# Patient Record
Sex: MALE | Race: WHITE | NOT HISPANIC OR LATINO | URBAN - METROPOLITAN AREA
[De-identification: names, ages, dates, MRNs, and addresses within clinical notes are randomized per-mention and may not be internally consistent; named-entity substitution may affect disease eponyms.]

---

## 2023-09-12 ENCOUNTER — OFFICE VISIT (OUTPATIENT)
Dept: NEUROLOGY | Facility: CLINIC | Age: 28
End: 2023-09-12
Payer: COMMERCIAL

## 2023-09-12 ENCOUNTER — LAB VISIT (OUTPATIENT)
Dept: LAB | Facility: HOSPITAL | Age: 28
End: 2023-09-12
Attending: PSYCHIATRY & NEUROLOGY
Payer: COMMERCIAL

## 2023-09-12 VITALS — WEIGHT: 192.44 LBS | HEART RATE: 65 BPM | DIASTOLIC BLOOD PRESSURE: 85 MMHG | SYSTOLIC BLOOD PRESSURE: 126 MMHG

## 2023-09-12 DIAGNOSIS — G43.711 CHRONIC MIGRAINE WITHOUT AURA, WITH INTRACTABLE MIGRAINE, SO STATED, WITH STATUS MIGRAINOSUS: ICD-10-CM

## 2023-09-12 DIAGNOSIS — G43.711 CHRONIC MIGRAINE WITHOUT AURA, WITH INTRACTABLE MIGRAINE, SO STATED, WITH STATUS MIGRAINOSUS: Primary | ICD-10-CM

## 2023-09-12 LAB — VIT B12 SERPL-MCNC: 641 PG/ML (ref 210–950)

## 2023-09-12 PROCEDURE — 99999 PR PBB SHADOW E&M-NEW PATIENT-LVL III: CPT | Mod: PBBFAC,,, | Performed by: PSYCHIATRY & NEUROLOGY

## 2023-09-12 PROCEDURE — 1159F MED LIST DOCD IN RCRD: CPT | Mod: CPTII,S$GLB,, | Performed by: PSYCHIATRY & NEUROLOGY

## 2023-09-12 PROCEDURE — 3079F PR MOST RECENT DIASTOLIC BLOOD PRESSURE 80-89 MM HG: ICD-10-PCS | Mod: CPTII,S$GLB,, | Performed by: PSYCHIATRY & NEUROLOGY

## 2023-09-12 PROCEDURE — 84252 ASSAY OF VITAMIN B-2: CPT | Performed by: PSYCHIATRY & NEUROLOGY

## 2023-09-12 PROCEDURE — 82607 VITAMIN B-12: CPT | Performed by: PSYCHIATRY & NEUROLOGY

## 2023-09-12 PROCEDURE — 99205 OFFICE O/P NEW HI 60 MIN: CPT | Mod: S$GLB,,, | Performed by: PSYCHIATRY & NEUROLOGY

## 2023-09-12 PROCEDURE — 36415 COLL VENOUS BLD VENIPUNCTURE: CPT | Performed by: PSYCHIATRY & NEUROLOGY

## 2023-09-12 PROCEDURE — 1159F PR MEDICATION LIST DOCUMENTED IN MEDICAL RECORD: ICD-10-PCS | Mod: CPTII,S$GLB,, | Performed by: PSYCHIATRY & NEUROLOGY

## 2023-09-12 PROCEDURE — 99205 PR OFFICE/OUTPT VISIT, NEW, LEVL V, 60-74 MIN: ICD-10-PCS | Mod: S$GLB,,, | Performed by: PSYCHIATRY & NEUROLOGY

## 2023-09-12 PROCEDURE — 3074F PR MOST RECENT SYSTOLIC BLOOD PRESSURE < 130 MM HG: ICD-10-PCS | Mod: CPTII,S$GLB,, | Performed by: PSYCHIATRY & NEUROLOGY

## 2023-09-12 PROCEDURE — 84425 ASSAY OF VITAMIN B-1: CPT | Performed by: PSYCHIATRY & NEUROLOGY

## 2023-09-12 PROCEDURE — 3074F SYST BP LT 130 MM HG: CPT | Mod: CPTII,S$GLB,, | Performed by: PSYCHIATRY & NEUROLOGY

## 2023-09-12 PROCEDURE — 86364 TISS TRNSGLTMNASE EA IG CLAS: CPT | Performed by: PSYCHIATRY & NEUROLOGY

## 2023-09-12 PROCEDURE — 84207 ASSAY OF VITAMIN B-6: CPT | Performed by: PSYCHIATRY & NEUROLOGY

## 2023-09-12 PROCEDURE — 99999 PR PBB SHADOW E&M-NEW PATIENT-LVL III: ICD-10-PCS | Mod: PBBFAC,,, | Performed by: PSYCHIATRY & NEUROLOGY

## 2023-09-12 PROCEDURE — 3079F DIAST BP 80-89 MM HG: CPT | Mod: CPTII,S$GLB,, | Performed by: PSYCHIATRY & NEUROLOGY

## 2023-09-12 RX ORDER — RIMEGEPANT SULFATE 75 MG/75MG
75 TABLET, ORALLY DISINTEGRATING ORAL ONCE AS NEEDED
COMMUNITY
End: 2023-09-12

## 2023-09-12 RX ORDER — ONDANSETRON 4 MG/1
8 TABLET, FILM COATED ORAL EVERY 4 HOURS PRN
COMMUNITY
End: 2023-09-13 | Stop reason: SDUPTHER

## 2023-09-12 RX ORDER — TRIPROLIDINE/PSEUDOEPHEDRINE 2.5MG-60MG
TABLET ORAL EVERY 6 HOURS PRN
COMMUNITY

## 2023-09-12 RX ORDER — RIMEGEPANT SULFATE 75 MG/75MG
75 TABLET, ORALLY DISINTEGRATING ORAL ONCE AS NEEDED
Qty: 12 TABLET | Refills: 12 | Status: SHIPPED | OUTPATIENT
Start: 2023-09-12 | End: 2023-09-13 | Stop reason: SDUPTHER

## 2023-09-12 RX ORDER — SUMATRIPTAN 5 MG/1
5 SPRAY NASAL ONCE
COMMUNITY
End: 2023-11-30 | Stop reason: SDUPTHER

## 2023-09-12 RX ORDER — ONDANSETRON 8 MG/1
8 TABLET, ORALLY DISINTEGRATING ORAL EVERY 12 HOURS PRN
Qty: 20 TABLET | Refills: 6 | Status: SHIPPED | OUTPATIENT
Start: 2023-09-12 | End: 2023-12-12 | Stop reason: SDUPTHER

## 2023-09-12 RX ORDER — ZONISAMIDE 100 MG/1
300 CAPSULE ORAL DAILY
Qty: 90 CAPSULE | Refills: 11 | Status: SHIPPED | OUTPATIENT
Start: 2023-09-12 | End: 2023-09-13 | Stop reason: SDUPTHER

## 2023-09-12 NOTE — PROGRESS NOTES
Subjective     Patient ID: Shahbaz Holbrook is a 28 y.o. male.    Chief Complaint: Headache, Migraine, and Consult  Self referral   HPI  PhD student     Headache history:   Age of onset -  childhood   Location - behind eyes, L or R   Nature of pain -  throbbing   Prodrome - no   Aura -  no   Duration of headache - > 4 hrs   Time to peak intensity - hrs   Pain scale - range of intensity -  10/10  Frequency -  5/days of week   Status Migrainosus history - yes, 4 days   Time of day of most headaches- anytime     Associated symptoms with the headache:   Meningeal symptoms - photophobia, phonophobia, exercise intolerance +   Nausea/vomitting +   Nasal drainage   Visual blurriness   Pallor/flushing  Dizziness +   Vertigo  Confusion  Impaired concentration +   Pain worsened with physical activity +  Neck pain +     Cluster headache symptoms: none   Symptoms of increased intracranial pressure: none     Headache Triggers: unknown     Other comorbid conditions:  Anxiety - no   Motion sickness symptom - no     Treatment history:  Resolution of headache with sleep - yes   Meds tried:  Relpax, maxalt, nasal imitrex, Imitrex - not  help   Zofran - helps   Ubrelvy - not help   Nurtec - helps   Preventive options: AIMOVIG 140 mg x 1 yr - elevated BP, Topamax, Elavil 10 mg, Nurtec, Emgality     Headache risk factors:   H/o TBI  - no   H/o Meningitis  - no   F/h Aneurysms  - no    Headache burden: missed several days of school       Review of Systems   Constitutional: Negative.    HENT: Negative.     Eyes: Negative.    Respiratory: Negative.     Cardiovascular: Negative.    Gastrointestinal: Negative.    Endocrine: Negative.    Genitourinary: Negative.    Musculoskeletal: Negative.    Integumentary:  Negative.   Allergic/Immunologic: Negative.    Neurological: Negative.    Hematological: Negative.    Psychiatric/Behavioral: Negative.          Objective     Physical Exam  Constitutional:       Appearance: Normal appearance.   HENT:       Head: Normocephalic and atraumatic.      Right Ear: External ear normal.      Left Ear: External ear normal.      Nose: Nose normal.      Mouth/Throat:      Mouth: Mucous membranes are moist.   Eyes:      Extraocular Movements: Extraocular movements intact.      Conjunctiva/sclera: Conjunctivae normal.      Pupils: Pupils are equal, round, and reactive to light.   Cardiovascular:      Rate and Rhythm: Normal rate.      Pulses: Normal pulses.   Pulmonary:      Effort: Pulmonary effort is normal.   Musculoskeletal:         General: Normal range of motion.      Cervical back: Normal range of motion.   Skin:     General: Skin is warm.   Neurological:      General: No focal deficit present.      Mental Status: He is alert and oriented to person, place, and time.      Sensory: No sensory deficit.      Motor: No weakness.      Coordination: Coordination normal.      Gait: Gait normal.   Psychiatric:         Mood and Affect: Mood normal.         Behavior: Behavior normal.         Thought Content: Thought content normal.         Judgment: Judgment normal.     Headache Exam:  Optic disc is flat OU   Temples appear symmetric  No V1,V2,V3 distribution allodynia/hyperalgesia alphonse   No facial swelling  No gross TMJ abnormalities   No ptosis   No conj injection   No meningismus   No neck stiffness  No C spine tenderness  No Cervical facet tenderness on palpation alphonse   No tender points over trapezium   No supraorbital nerve exit point tenderness  No occipital nerve exit point tenderness  No auriculotemporal nerve tenderness      Assessment and Plan     1. Chronic migraine without aura, with intractable migraine, so stated, with status migrainosus    S/p LASIK        Start Zonisamide 100 mg daily -> 300 mg. Discussed side effects   Start VYEPTI 300 mg. Discussed side effects   PRN - zofran, nurtec  Given long standing history of headaches with no change in character and no associated neurological symptoms, no reportable neurological  symptoms in between episodes, and normal neuro exam today there is no indication for an MRI.   Will check vitamin B1, B2, B6, B12, gluten enteropathy testing.      No follow-ups on file.

## 2023-09-13 ENCOUNTER — TELEPHONE (OUTPATIENT)
Dept: NEUROLOGY | Facility: CLINIC | Age: 28
End: 2023-09-13
Payer: COMMERCIAL

## 2023-09-13 ENCOUNTER — PATIENT MESSAGE (OUTPATIENT)
Dept: NEUROLOGY | Facility: CLINIC | Age: 28
End: 2023-09-13
Payer: COMMERCIAL

## 2023-09-13 DIAGNOSIS — G43.711 CHRONIC MIGRAINE WITHOUT AURA, WITH INTRACTABLE MIGRAINE, SO STATED, WITH STATUS MIGRAINOSUS: ICD-10-CM

## 2023-09-13 RX ORDER — SODIUM CHLORIDE 9 MG/ML
INJECTION, SOLUTION INTRAVENOUS ONCE AS NEEDED
Status: CANCELLED | OUTPATIENT
Start: 2023-09-13

## 2023-09-13 RX ORDER — SODIUM CHLORIDE 0.9 % (FLUSH) 0.9 %
10 SYRINGE (ML) INJECTION
Status: CANCELLED | OUTPATIENT
Start: 2023-09-13

## 2023-09-13 RX ORDER — DIPHENHYDRAMINE HYDROCHLORIDE 50 MG/ML
25 INJECTION INTRAMUSCULAR; INTRAVENOUS ONCE AS NEEDED
Status: CANCELLED | OUTPATIENT
Start: 2023-09-13

## 2023-09-13 RX ORDER — EPINEPHRINE 0.3 MG/.3ML
0.3 INJECTION SUBCUTANEOUS ONCE AS NEEDED
Status: CANCELLED
Start: 2023-09-13

## 2023-09-13 RX ORDER — ACETAMINOPHEN 500 MG
1000 TABLET ORAL ONCE AS NEEDED
Status: CANCELLED | OUTPATIENT
Start: 2023-09-13

## 2023-09-13 RX ORDER — METHYLPREDNISOLONE SOD SUCC 125 MG
125 VIAL (EA) INJECTION ONCE AS NEEDED
Status: CANCELLED | OUTPATIENT
Start: 2023-09-13

## 2023-09-13 RX ORDER — ONDANSETRON 2 MG/ML
8 INJECTION INTRAMUSCULAR; INTRAVENOUS ONCE AS NEEDED
Status: CANCELLED | OUTPATIENT
Start: 2023-09-13

## 2023-09-13 NOTE — TELEPHONE ENCOUNTER
----- Message from Natalia Arnold sent at 9/13/2023  1:26 PM CDT -----  Regarding: RE: Chart Notes Or prior Authorization  Contact: Nikita [phone]  This is not a PA for me to complete. [facility] pharmacy is requesting chart notes so that they can complete the PA.   ----- Message -----  From: Lizz Gonsales MA  Sent: 9/13/2023  10:28 AM CDT  To: Natalia Arnold  Subject: FW: Chart Notes Or prior Authorization             ----- Message -----  From: Zenia Sampson  Sent: 9/13/2023   9:26 AM CDT  To: [facility]to Stallworth Staff  Subject: Chart Notes Or prior Authorization               Nikita with [facility] is calling in ref to pt's medication rimegepant (NURTEC) 75 mg odt. He says chart notes are needed for prior authorization. Best Call Back Number: Nikita [phone] fax [phone]

## 2023-09-13 NOTE — TELEPHONE ENCOUNTER
----- Message from Basim Dangelo MD sent at 9/12/2023  3:46 PM CDT -----  Regarding: VYEPTI  I would like to start VYEPTI 300 mg IV Q 90 days for chronic migraine

## 2023-09-13 NOTE — TELEPHONE ENCOUNTER
Vyepti 300mg q3 months therapy plan placed per 's order. OHIP notified. Pending authorization from insurance. Once approved OHIP will contact patient for scheduling.

## 2023-09-15 LAB
GLIADIN PEPTIDE IGA SER-ACNC: 2 U/ML
GLIADIN PEPTIDE IGG SER-ACNC: <0.6 U/ML
IGA SERPL-MCNC: 139 MG/DL (ref 70–400)
TTG IGA SER-ACNC: <0.2 U/ML
TTG IGG SER-ACNC: <0.6 U/ML

## 2023-09-16 LAB — VIT B2 SERPL-MCNC: 9 MCG/L (ref 1–19)

## 2023-09-18 LAB
PYRIDOXAL SERPL-MCNC: 62 UG/L (ref 5–50)
VIT B1 BLD-MCNC: 58 UG/L (ref 38–122)

## 2023-09-18 RX ORDER — ONDANSETRON 4 MG/1
8 TABLET, FILM COATED ORAL EVERY 4 HOURS PRN
Qty: 40 TABLET | Refills: 12 | Status: SHIPPED | OUTPATIENT
Start: 2023-09-18

## 2023-09-18 RX ORDER — RIMEGEPANT SULFATE 75 MG/75MG
75 TABLET, ORALLY DISINTEGRATING ORAL ONCE AS NEEDED
Qty: 12 TABLET | Refills: 11 | Status: SHIPPED | OUTPATIENT
Start: 2023-09-18 | End: 2023-09-27 | Stop reason: SDUPTHER

## 2023-09-18 RX ORDER — ZONISAMIDE 100 MG/1
300 CAPSULE ORAL DAILY
Qty: 90 CAPSULE | Refills: 11 | Status: SHIPPED | OUTPATIENT
Start: 2023-09-18 | End: 2023-12-12 | Stop reason: SDUPTHER

## 2023-09-19 ENCOUNTER — TELEPHONE (OUTPATIENT)
Dept: NEUROLOGY | Facility: CLINIC | Age: 28
End: 2023-09-19
Payer: COMMERCIAL

## 2023-09-19 NOTE — TELEPHONE ENCOUNTER
----- Message from Lizz Gonsales MA sent at 9/19/2023 10:16 AM CDT -----  Regarding: FW: Prior Auth Status  Contact: [phone]    ----- Message -----  From: Corin Metcalf  Sent: 9/19/2023  10:04 AM CDT  To: L[facility]to Stallworth Staff  Subject: Prior Auth Status                                CONSULT/ADVISORY    Name of Caller: [facility]    Contact Preference: [phone]    Nature of Call: Calling to check the status of a prior auth for rx zavegepant 10 mg/actuation [place]

## 2023-09-25 ENCOUNTER — TELEPHONE (OUTPATIENT)
Dept: NEUROLOGY | Facility: CLINIC | Age: 28
End: 2023-09-25
Payer: COMMERCIAL

## 2023-09-25 NOTE — TELEPHONE ENCOUNTER
Notified by Emily with OHIP that the pt's insurance had denied the vyepti infusions:     Shahbaz Holbrook has been Denied:     Authorization DENIED - VYEPTI 300mg every 3 months   Insurance company: [facility] [employer]   Phone: [phone]   Denial provided via: Phone   Spoke with: Susan   Drug codes DENIED:    Denial Reason:  not medically necessary   Ref#:  [id number]   Can appeal, but no P2P info provided               Asked Emily if she can obtain official insurance denial letter and send to me so I can begin appeal. Awaiting response.

## 2023-09-26 ENCOUNTER — TELEPHONE (OUTPATIENT)
Dept: NEUROLOGY | Facility: CLINIC | Age: 28
End: 2023-09-26
Payer: COMMERCIAL

## 2023-09-26 NOTE — TELEPHONE ENCOUNTER
----- Message from Lizz Gonsales MA sent at 9/26/2023 10:02 AM CDT -----  Regarding: FW: RX injection / appt  Contact: PT @ [phone]    ----- Message -----  From: Manoj Martinez  Sent: 9/26/2023   8:36 AM CDT  To: [facility]to Stallworth Staff  Subject: RX injection / appt                              Pt is calling asking to speak with someone in the office to schedule an injection appt to receive rx: Vyepti (Eptinezumab-jjmr). Pt states that Dr. Dangelo advised him that someone would call him within a week from his last appt to schedule the injection; pt states that he has not heard from anyone. Please call to advise. Thanks.

## 2023-09-26 NOTE — TELEPHONE ENCOUNTER
----- Message from Lizz Gonsales MA sent at 9/26/2023 10:02 AM CDT -----  Regarding: FW: RX approval update  Contact: PT @ [phone]    ----- Message -----  From: Manoj Martinez  Sent: 9/26/2023   8:32 AM CDT  To: Loreto Stallworth Staff  Subject: RX approval update                               Pt is calling asking to speak with someone in the office to discuss rx: rimegepant (NURTEC) 75 mg odt. Pt states that he spoke to the specialty pharmacy and they have advised him that they have sent over paper work for the office to fill out; this was on 9/12/2023 and still have not received documents back. Pt is asking for a return call back for an update. Thanks.      [facility] - [place], [place] - [address] [facility]   [address] [facility] [place]   [address]  [facility] [address]  Phone: [phone] Fax: [phone]    Pt states that he spoke to [facility] and they sent the info to Neena. Neena states that they are the ones waiting on the paper work from the office.      [facility] (New Address) - [place], [place] - [address] [place] [address] AT Previously: [address], [address]  [address]  [facility] [address]  Phone: [phone] Fax: [phone]

## 2023-09-27 DIAGNOSIS — G43.711 CHRONIC MIGRAINE WITHOUT AURA, WITH INTRACTABLE MIGRAINE, SO STATED, WITH STATUS MIGRAINOSUS: ICD-10-CM

## 2023-09-27 RX ORDER — RIMEGEPANT SULFATE 75 MG/75MG
75 TABLET, ORALLY DISINTEGRATING ORAL ONCE AS NEEDED
Qty: 12 TABLET | Refills: 11 | Status: SHIPPED | OUTPATIENT
Start: 2023-09-27

## 2023-09-27 NOTE — TELEPHONE ENCOUNTER
Outgoing call to [employer] at [phone] and asked them to send Vyepti denial letter to our fax [phone]. Representative confirmed correct fax number and stated that we should receive the fax in 24-48 hours.

## 2023-09-27 NOTE — TELEPHONE ENCOUNTER
----- Message from Lizz Gonsales MA sent at 9/27/2023 10:32 AM CDT -----  Contact: pt [phone]    ----- Message -----  From: Amanda Pappas, SHELLEY  Sent: 9/27/2023  10:02 AM CDT  To: Basim Dangelo MD; Marissa Amaro, RN; #    Am I correct that you are handling Loreto's scripts?   ----- Message -----  From: Gisele Hawkins  Sent: 9/27/2023   8:18 AM CDT  To: [facility]to Stallworth Staff    PATIENTCALL     Pt is calling to speak with someone in provider office states he has been calling this week to get his medication refilled but the pharmacy states that they need more information he states that they need to get a verbal for the Vyepti infusionis asking for a return call please call pt at [phone]   He states that you can call the pharmacy to give the verbal at [phone] patient states that he knows that you sent a order but, you need to speak to the pharmacy and give them instructions also.

## 2023-09-27 NOTE — TELEPHONE ENCOUNTER
"[facility] at [phone] per patient request. Spoke to Nichole and told her that patient asked that the office call that number regarding needing a "verbal or instructions"?. Nichole explained that the rx was sent to [facility] but that the rx is actually processed through [facility] and then gets sent to [facility] who then will mail it out to patient. Advised Nichole that I will have  [facility] instead.   "

## 2023-09-28 ENCOUNTER — TELEPHONE (OUTPATIENT)
Dept: NEUROLOGY | Facility: CLINIC | Age: 28
End: 2023-09-28
Payer: COMMERCIAL

## 2023-09-28 NOTE — TELEPHONE ENCOUNTER
"Outgoing call to [facility] ([facility] pharmacy at  [phone].  Explained to [facility] that I received notice that the pharmacy needed clarification on the instructions for zavzpret nasal spray. Informed pharmacy that I noticed  wrote the rx for "10 mg by Nasal route 2 (two) times daily as needed (migraine)" however the dosing guidelines are supposed to be "10mg in 1 nostril once as needed for migraine. Max 10mg/24 hours". Spoke to Sally and gave updated dosing instructions as listed above and she addended the rx and they will start working on filling it and sending it out.   "

## 2023-09-28 NOTE — TELEPHONE ENCOUNTER
----- Message from Amanda Pappas RN sent at 9/28/2023  9:25 AM CDT -----  Regarding: FW: rx  Contact: [phone]    ----- Message -----  From: Geetha Hilario  Sent: 9/28/2023   8:11 AM CDT  To: L[facility]to Stallworth Staff  Subject: rx                                               Pt calling needing clarification to the pharmacy on zavegepant 10 mg/actuation [place]. Pt states pharmacy denied him from getting it. Pharmacy just needs to clarify medication instructions. Please call pharmacy [phone].

## 2023-10-06 NOTE — TELEPHONE ENCOUNTER
Received faxed denial letter for Vyepti and drafted appeal letter and faxed appeal to [facility] [employer] at fax # [phone] on 10/6/23. Pt updated via message.

## 2023-11-29 ENCOUNTER — TELEPHONE (OUTPATIENT)
Dept: NEUROLOGY | Facility: CLINIC | Age: 28
End: 2023-11-29
Payer: COMMERCIAL

## 2023-11-29 NOTE — TELEPHONE ENCOUNTER
----- Message from Yoli Hatfield sent at 11/29/2023  9:36 AM CST -----  Regarding: Refill  Contact: Pt [phone]  Rx Refill/Request    Is this a Refill or New Rx:  Refill    Rx Name and Strength: SUMAtriptan (IMITREX) 5 mg/actuation nasal spray     Preferred Pharmacy with phone number:  [id number] @ [address] [address]  Phone: [phone] Fax: [phone]      Additional Information:pt called last week to request refill. Please call [phone]       I will SWITCH the dose or number of times a day I take the medications listed below when I get home from the hospital:  None

## 2023-11-30 RX ORDER — SUMATRIPTAN 5 MG/1
5 SPRAY NASAL ONCE
Qty: 6 EACH | Refills: 12 | Status: SHIPPED | OUTPATIENT
Start: 2023-11-30 | End: 2023-11-30

## 2023-11-30 NOTE — TELEPHONE ENCOUNTER
SUMAtriptan (IMITREX) 5 mg/actuation nasal spray     Preferred Pharmacy with phone number:  [id number] @ [facility] [place] - [address] AT [address] [place]  [address] [place] [address]    PATIENT LAST SEEN 9/12/23

## 2023-12-12 DIAGNOSIS — G43.711 CHRONIC MIGRAINE WITHOUT AURA, WITH INTRACTABLE MIGRAINE, SO STATED, WITH STATUS MIGRAINOSUS: ICD-10-CM

## 2023-12-21 RX ORDER — ONDANSETRON 8 MG/1
8 TABLET, ORALLY DISINTEGRATING ORAL EVERY 12 HOURS PRN
Qty: 20 TABLET | Refills: 6 | Status: SHIPPED | OUTPATIENT
Start: 2023-12-21

## 2023-12-21 RX ORDER — ZONISAMIDE 100 MG/1
300 CAPSULE ORAL DAILY
Qty: 90 CAPSULE | Refills: 11 | Status: SHIPPED | OUTPATIENT
Start: 2023-12-21 | End: 2024-12-20

## 2024-01-16 ENCOUNTER — TELEPHONE (OUTPATIENT)
Dept: NEUROLOGY | Facility: CLINIC | Age: 29
End: 2024-01-16
Payer: COMMERCIAL

## 2024-01-16 NOTE — TELEPHONE ENCOUNTER
----- Message from Savanna Hogue sent at 1/16/2024  4:42 PM CST -----  Regarding: Medication  Contact: [facility] [phone]  Michele/ [facility] is calling to speak to provider about dosage for rx: zavegepant 10 mg/actuation Spry 12 each please call

## 2024-01-24 ENCOUNTER — TELEPHONE (OUTPATIENT)
Dept: NEUROLOGY | Facility: CLINIC | Age: 29
End: 2024-01-24
Payer: COMMERCIAL

## 2024-01-24 NOTE — TELEPHONE ENCOUNTER
----- Message from Marta Pang sent at 1/24/2024  1:32 PM CST -----  Regarding: clarification  Contact: Margarito @ [phone]  Margarito(pharmacist) from [facility] is calling for clarification. Asking for a call back

## 2024-09-13 ENCOUNTER — PATIENT MESSAGE (OUTPATIENT)
Dept: PHARMACY | Facility: CLINIC | Age: 29
End: 2024-09-13
Payer: COMMERCIAL

## 2024-10-01 DIAGNOSIS — G43.711 CHRONIC MIGRAINE WITHOUT AURA, WITH INTRACTABLE MIGRAINE, SO STATED, WITH STATUS MIGRAINOSUS: ICD-10-CM

## 2024-10-01 RX ORDER — RIMEGEPANT SULFATE 75 MG/75MG
75 TABLET, ORALLY DISINTEGRATING ORAL ONCE AS NEEDED
Qty: 12 TABLET | Refills: 11 | Status: SHIPPED | OUTPATIENT
Start: 2024-10-01

## 2024-11-08 ENCOUNTER — TELEPHONE (OUTPATIENT)
Dept: NEUROLOGY | Facility: CLINIC | Age: 29
End: 2024-11-08
Payer: COMMERCIAL

## 2024-11-11 RX ORDER — EPINEPHRINE 0.3 MG/.3ML
0.3 INJECTION SUBCUTANEOUS ONCE AS NEEDED
Start: 2024-11-11

## 2024-11-11 RX ORDER — DIPHENHYDRAMINE HYDROCHLORIDE 50 MG/ML
25 INJECTION INTRAMUSCULAR; INTRAVENOUS ONCE AS NEEDED
OUTPATIENT
Start: 2024-11-11

## 2024-11-11 RX ORDER — SODIUM CHLORIDE 9 MG/ML
INJECTION, SOLUTION INTRAVENOUS ONCE AS NEEDED
OUTPATIENT
Start: 2024-11-11

## 2024-11-11 RX ORDER — ACETAMINOPHEN 500 MG
1000 TABLET ORAL ONCE AS NEEDED
OUTPATIENT
Start: 2024-11-11

## 2024-11-11 RX ORDER — METHYLPREDNISOLONE SOD SUCC 125 MG
125 VIAL (EA) INJECTION ONCE AS NEEDED
OUTPATIENT
Start: 2024-11-11

## 2024-11-11 RX ORDER — SODIUM CHLORIDE 0.9 % (FLUSH) 0.9 %
10 SYRINGE (ML) INJECTION
OUTPATIENT
Start: 2024-11-11

## 2024-11-11 RX ORDER — ONDANSETRON HYDROCHLORIDE 2 MG/ML
8 INJECTION, SOLUTION INTRAVENOUS ONCE AS NEEDED
OUTPATIENT
Start: 2024-11-11

## 2024-11-19 DIAGNOSIS — G43.711 CHRONIC MIGRAINE WITHOUT AURA, WITH INTRACTABLE MIGRAINE, SO STATED, WITH STATUS MIGRAINOSUS: ICD-10-CM

## 2024-11-19 RX ORDER — RIMEGEPANT SULFATE 75 MG/75MG
75 TABLET, ORALLY DISINTEGRATING ORAL ONCE AS NEEDED
Qty: 12 TABLET | Refills: 11 | Status: SHIPPED | OUTPATIENT
Start: 2024-11-19

## 2024-12-03 ENCOUNTER — OFFICE VISIT (OUTPATIENT)
Dept: NEUROLOGY | Facility: CLINIC | Age: 29
End: 2024-12-03
Payer: COMMERCIAL

## 2024-12-03 VITALS
HEART RATE: 73 BPM | BODY MASS INDEX: 30.74 KG/M2 | SYSTOLIC BLOOD PRESSURE: 174 MMHG | WEIGHT: 207.56 LBS | HEIGHT: 69 IN | DIASTOLIC BLOOD PRESSURE: 94 MMHG

## 2024-12-03 DIAGNOSIS — G43.711 CHRONIC MIGRAINE WITHOUT AURA, WITH INTRACTABLE MIGRAINE, SO STATED, WITH STATUS MIGRAINOSUS: Primary | ICD-10-CM

## 2024-12-03 PROCEDURE — 3080F DIAST BP >= 90 MM HG: CPT | Mod: CPTII,S$GLB,, | Performed by: PSYCHIATRY & NEUROLOGY

## 2024-12-03 PROCEDURE — 99214 OFFICE O/P EST MOD 30 MIN: CPT | Mod: S$GLB,,, | Performed by: PSYCHIATRY & NEUROLOGY

## 2024-12-03 PROCEDURE — 3008F BODY MASS INDEX DOCD: CPT | Mod: CPTII,S$GLB,, | Performed by: PSYCHIATRY & NEUROLOGY

## 2024-12-03 PROCEDURE — 1159F MED LIST DOCD IN RCRD: CPT | Mod: CPTII,S$GLB,, | Performed by: PSYCHIATRY & NEUROLOGY

## 2024-12-03 PROCEDURE — 1160F RVW MEDS BY RX/DR IN RCRD: CPT | Mod: CPTII,S$GLB,, | Performed by: PSYCHIATRY & NEUROLOGY

## 2024-12-03 PROCEDURE — 99999 PR PBB SHADOW E&M-EST. PATIENT-LVL III: CPT | Mod: PBBFAC,,, | Performed by: PSYCHIATRY & NEUROLOGY

## 2024-12-03 PROCEDURE — 3077F SYST BP >= 140 MM HG: CPT | Mod: CPTII,S$GLB,, | Performed by: PSYCHIATRY & NEUROLOGY

## 2024-12-03 RX ORDER — ONDANSETRON 4 MG/1
4 TABLET, ORALLY DISINTEGRATING ORAL EVERY 8 HOURS PRN
Qty: 60 TABLET | Refills: 12 | Status: SHIPPED | OUTPATIENT
Start: 2024-12-03

## 2024-12-03 RX ORDER — SUMATRIPTAN 5 MG/1
5 SPRAY NASAL ONCE
Qty: 18 EACH | Refills: 3 | Status: SHIPPED | OUTPATIENT
Start: 2024-12-03 | End: 2024-12-03

## 2024-12-03 RX ORDER — ZONISAMIDE 100 MG/1
100 CAPSULE ORAL DAILY
Qty: 90 CAPSULE | Refills: 3 | Status: SHIPPED | OUTPATIENT
Start: 2024-12-03 | End: 2025-12-03

## 2024-12-03 RX ORDER — ATOGEPANT 30 MG/1
30 TABLET ORAL DAILY
Qty: 30 TABLET | Refills: 12 | Status: SHIPPED | OUTPATIENT
Start: 2024-12-03

## 2024-12-03 NOTE — PROGRESS NOTES
Subjective     Patient ID: Shahbaz Holbrook is a 29 y.o. male.    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   2-5 migraines per week     Headache history:   Age of onset -  childhood   Location - behind eyes, L or R   Nature of pain -  throbbing   Prodrome - no   Aura -  no   Duration of headache - > 4 hrs   Time to peak intensity - hrs   Pain scale - range of intensity -  10/10  Frequency -  5/days of week   Status Migrainosus history - yes, 4 days   Time of day of most headaches- anytime     Associated symptoms with the headache:   Meningeal symptoms - photophobia, phonophobia, exercise intolerance +   Nausea/vomitting +   Nasal drainage   Visual blurriness   Pallor/flushing  Dizziness +   Vertigo  Confusion  Impaired concentration +   Pain worsened with physical activity +  Neck pain +     Cluster headache symptoms: none   Symptoms of increased intracranial pressure: none     Headache Triggers: unknown     Other comorbid conditions:  Anxiety - no   Motion sickness symptom - no     Treatment history:  Resolution of headache with sleep - yes   Meds tried:  Relpax, maxalt, nasal imitrex, Imitrex - not  help   Zofran - helps   Ubrelvy - not help   Nurtec - helps   Preventive options: AIMOVIG 140 mg x 1 yr - elevated BP, Topamax, Elavil 10 mg, Nurtec, Emgality   Vyepti - helped (feels hung over for several days afterwards)     Headache risk factors:   H/o TBI  - no   H/o Meningitis  - no   F/h Aneurysms  - no    Headache burden: missed several days of school       Review of Systems   Constitutional: Negative.    HENT: Negative.     Eyes: Negative.    Respiratory: Negative.     Cardiovascular: Negative.    Gastrointestinal: Negative.    Endocrine: Negative.    Genitourinary: Negative.    Musculoskeletal: Negative.    Integumentary:  Negative.   Allergic/Immunologic: Negative.    Neurological: Negative.    Hematological: Negative.    Psychiatric/Behavioral: Negative.           Objective     Physical Exam  Constitutional:       Appearance: Normal appearance.   HENT:      Head: Normocephalic and atraumatic.      Right Ear: External ear normal.      Left Ear: External ear normal.      Nose: Nose normal.      Mouth/Throat:      Mouth: Mucous membranes are moist.   Eyes:      Extraocular Movements: Extraocular movements intact.      Conjunctiva/sclera: Conjunctivae normal.      Pupils: Pupils are equal, round, and reactive to light.   Cardiovascular:      Rate and Rhythm: Normal rate.      Pulses: Normal pulses.   Pulmonary:      Effort: Pulmonary effort is normal.   Musculoskeletal:         General: Normal range of motion.      Cervical back: Normal range of motion.   Skin:     General: Skin is warm.   Neurological:      General: No focal deficit present.      Mental Status: He is alert and oriented to person, place, and time.      Sensory: No sensory deficit.      Motor: No weakness.      Coordination: Coordination normal.      Gait: Gait normal.   Psychiatric:         Mood and Affect: Mood normal.         Behavior: Behavior normal.         Thought Content: Thought content normal.         Judgment: Judgment normal.          Assessment and Plan     1. Chronic migraine without aura, with intractable migraine, so stated, with status migrainosus      S/p LASIK       Cont  Zonisamide 100 mg daily   Stop VYEPTI 300 mg.   Start Qulipta  30 mg -> titrate to 60 mg   PRN - zofran, nurtec, imitrex       No follow-ups on file.

## 2025-01-09 ENCOUNTER — PATIENT MESSAGE (OUTPATIENT)
Dept: NEUROLOGY | Facility: CLINIC | Age: 30
End: 2025-01-09
Payer: COMMERCIAL

## 2025-01-09 DIAGNOSIS — G43.711 CHRONIC MIGRAINE WITHOUT AURA, WITH INTRACTABLE MIGRAINE, SO STATED, WITH STATUS MIGRAINOSUS: ICD-10-CM

## 2025-01-09 RX ORDER — ATOGEPANT 30 MG/1
60 TABLET ORAL DAILY
Qty: 180 TABLET | Refills: 3 | Status: SHIPPED | OUTPATIENT
Start: 2025-01-09

## 2025-01-13 ENCOUNTER — TELEPHONE (OUTPATIENT)
Facility: CLINIC | Age: 30
End: 2025-01-13
Payer: COMMERCIAL

## 2025-01-13 DIAGNOSIS — G43.711 CHRONIC MIGRAINE WITHOUT AURA, WITH INTRACTABLE MIGRAINE, SO STATED, WITH STATUS MIGRAINOSUS: Primary | ICD-10-CM

## 2025-01-13 RX ORDER — ATOGEPANT 60 MG/1
60 TABLET ORAL DAILY
Qty: 30 TABLET | Refills: 12 | Status: SHIPPED | OUTPATIENT
Start: 2025-01-13

## 2025-01-13 NOTE — TELEPHONE ENCOUNTER
----- Message from Corin sent at 1/13/2025 11:00 AM CST -----  Regarding: Pharmacy Needing Assistance  Contact: [facility]  PHARMACY NEEDING ASSISTANCE    Name of Pharmacy:  [facility]    Name of Caller:  [facility]    Regarding RX: SUMAtriptan (IMITREX) 5 mg/actuation nasal spray    Reason for Call:  Clarification on Rx    Call Back Number: [phone]

## 2025-01-13 NOTE — TELEPHONE ENCOUNTER
----- Message from Corin sent at 1/13/2025 11:00 AM CST -----  Regarding: Pharmacy Needing Assistance  Contact: [facility]  PHARMACY NEEDING ASSISTANCE    Name of Pharmacy:  [facility]    Name of Caller:  [facility]    Regarding RX: SUMAtriptan (IMITREX) 5 mg/actuation nasal spray    Reason for Call:  Clarification on Rx    Call Back Number: [phone]

## 2025-01-14 DIAGNOSIS — G43.711 CHRONIC MIGRAINE WITHOUT AURA, WITH INTRACTABLE MIGRAINE, SO STATED, WITH STATUS MIGRAINOSUS: Primary | ICD-10-CM

## 2025-01-14 RX ORDER — SUMATRIPTAN 20 MG/1
1 SPRAY NASAL 2 TIMES DAILY PRN
Qty: 6 EACH | Refills: 12 | Status: SHIPPED | OUTPATIENT
Start: 2025-01-14 | End: 2025-02-13